# Patient Record
Sex: MALE | Race: BLACK OR AFRICAN AMERICAN | NOT HISPANIC OR LATINO | URBAN - METROPOLITAN AREA
[De-identification: names, ages, dates, MRNs, and addresses within clinical notes are randomized per-mention and may not be internally consistent; named-entity substitution may affect disease eponyms.]

---

## 2019-11-09 ENCOUNTER — EMERGENCY (EMERGENCY)
Facility: HOSPITAL | Age: 49
LOS: 1 days | Discharge: HOME | End: 2019-11-09
Attending: EMERGENCY MEDICINE
Payer: SELF-PAY

## 2019-11-09 VITALS
SYSTOLIC BLOOD PRESSURE: 141 MMHG | DIASTOLIC BLOOD PRESSURE: 84 MMHG | TEMPERATURE: 98 F | WEIGHT: 192.02 LBS | RESPIRATION RATE: 18 BRPM | HEIGHT: 67 IN | OXYGEN SATURATION: 99 % | HEART RATE: 72 BPM

## 2019-11-09 PROCEDURE — 99282 EMERGENCY DEPT VISIT SF MDM: CPT | Mod: 25

## 2019-11-09 PROCEDURE — 69209 REMOVE IMPACTED EAR WAX UNI: CPT | Mod: 50

## 2019-11-09 NOTE — ED PROVIDER NOTE - PROGRESS NOTE DETAILS
Pt stated feeling much better after b/l cerumen removal. Normal TMs, mild irritation outer ear canal without swelling.

## 2019-11-09 NOTE — ED PROVIDER NOTE - OBJECTIVE STATEMENT
50 y/o M with PMH of HTN presenting for decreased hearing in right ear x2 days. Patient states he has very occasional, minimal pain. Denies discharge from ear, no dizziness or gait abnormalities. Denies fevers, congestion, rhinorrhea, cough, or other URI symptoms. Had similar episode in past which resolved spontaneously after 2 days. Denies using q-tip or putting other foreign body in ear. 48 y/o M with PMH of HTN presenting for decreased hearing in right ear x2 days. Patient states he has very occasional, minimal pain. Denies discharge from ear, no dizziness or gait abnormalities. Denies fevers, congestion, rhinorrhea, cough, or other URI symptoms. Had similar episode in past which resolved spontaneously after 2 days. Denies using q-tip or putting other foreign body in ear.     Attending note.  Agree with the above. Patient was seen in the fast track room #5. She is complaining of discomfort in the right ear with decreased hearing for the last 2 days. He denies any tinnitus, sore throat or other ENT complaints. He denies any trauma or injury to his ear.

## 2019-11-09 NOTE — ED ADULT NURSE NOTE - OBJECTIVE STATEMENT
49y M aaox4 presents to ED ambulatory from home c/o Right ear pain, As per pt the pain started 2 days ago, denies trauma, fever. The pain got worse and also c/o hearing problem w/ right ear.

## 2019-11-09 NOTE — ED PROVIDER NOTE - NS ED ROS FT
CONSTITUTIONAL: No fevers, no chills, no lightheadedness, no dizziness  Eyes: no visual changes  Ears: +decreased hearing in right ear; no ear drainage, no ear pain  Nose: no nasal congestion  Mouth/Throat: no sore throat  CV: No chest pain, no palpitations  PULM: No SOB, no cough  GI: No n/v/d, no abd pain  : no dysuria, no hematuria  SKIN: no rashes.  NEURO: no headache, no focal weakness or numbness  LYMPH/VASC: no LE swelling

## 2019-11-09 NOTE — ED PROCEDURE NOTE - GENERAL PROCEDURE DETAILS
diluted hydrogen peroxide solution left in right ear canal for 15 minutes and then irrigated copiously with warm water

## 2019-11-09 NOTE — ED ADULT NURSE NOTE - NSIMPLEMENTINTERV_GEN_ALL_ED
Implemented All Universal Safety Interventions:  [place] to call system. Call bell, personal items and telephone within reach. Instruct patient to call for assistance. Room bathroom lighting operational. Non-slip footwear when patient is off stretcher. Physically safe environment: no spills, clutter or unnecessary equipment. Stretcher in lowest position, wheels locked, appropriate side rails in place.

## 2019-11-09 NOTE — ED PROVIDER NOTE - PATIENT PORTAL LINK FT
You can access the FollowMyHealth Patient Portal offered by [facility] by registering at the following website: [email or web address]. By joining [facility]’s FollowMyHealth portal, you will also be able to view your health information using other applications (apps) compatible with our system.

## 2019-11-09 NOTE — ED PROCEDURE NOTE - PROCEDURE ADDITIONAL DETAILS
Pt stated feeling much better after the procedure. Normal TMs, + Mild irritations to b/l outer ear canals without swelling.

## 2019-11-09 NOTE — ED ADULT TRIAGE NOTE - CHIEF COMPLAINT QUOTE
right earache x 2 days ago.  Pt reports he has no injury, no congestion, no fever,  no drainage, no ringing in ear.

## 2019-11-09 NOTE — ED ADULT NURSE NOTE - EENT WDL
Eyes with no visual disturbances.  Ears clean and dry . Nose with pink mucosa and no drainage.  Mouth mucous membranes moist and pink.  No tenderness or swelling to throat or neck.

## 2019-11-09 NOTE — ED PROVIDER NOTE - PHYSICAL EXAMINATION
gen: well appearing  Mentation: AAO x 3  psych: mood appropriate  ENT: airway patent; significant cerumen b/l ears, possible impaction on right  Eyes: conjunctivae clear bilaterally  Cardio: RRR, no m/r/g  Resp: normal BS b/l  GI: s/nt/nd   Neuro: sensation and motor function intact, CN 2-12 intact  Skin: No evidence of rash  MSK: normal movement of all extremities  Lymph/Vasc: no LE edema gen: well appearing  Mentation: AAO x 3  psych: mood appropriate  ENT: airway patent; significant cerumen b/l ears, possible impaction on right  Eyes: conjunctivae clear bilaterally  Cardio: RRR, no m/r/g  Resp: normal BS b/l  GI: s/nt/nd   Neuro: sensation and motor function intact, CN 2-12 intact  Skin: No evidence of rash  MSK: normal movement of all extremities  Lymph/Vasc: no LE edema     Attending note. Patient is alert and in no acute distress. Examination of the ear reveals no deformity, swelling, erythema, rash or lesions. Patient has cerumen impaction bilaterally. Oropharynx is normal. There is no submandibular or cervical adenopathy.

## 2019-11-09 NOTE — ED PROVIDER NOTE - CLINICAL SUMMARY MEDICAL DECISION MAKING FREE TEXT BOX
48 y/o M with no significant PMH presenting with decreased hearing on right, likely caused by impacted cerumen given lack of vestibular or infectious symptoms. Will disimpact with peroxide/water solution and irrigate, reassess. 48 y/o M with no significant PMH presenting with decreased hearing on right, likely caused by impacted cerumen given lack of vestibular or infectious symptoms. Will disimpact with peroxide/water solution and irrigate, reassess.     Attending note. Bilateral cerumen impaction. Irrigation of both ear canals and reassess.

## 2019-11-09 NOTE — ED PROVIDER NOTE - CARE PROVIDER_API CALL
Chandana Haider (MD)  Otolaryngology  [address] [place] [address], [facility] [address]  [place] [address]  Phone: [phone]  Fax: [phone]  Follow Up Time: